# Patient Record
Sex: MALE | Race: WHITE | Employment: OTHER | URBAN - NONMETROPOLITAN AREA
[De-identification: names, ages, dates, MRNs, and addresses within clinical notes are randomized per-mention and may not be internally consistent; named-entity substitution may affect disease eponyms.]

---

## 2020-09-01 ENCOUNTER — TELEPHONE (OUTPATIENT)
Dept: FAMILY MEDICINE CLINIC | Age: 45
End: 2020-09-01

## 2020-09-01 RX ORDER — SCOLOPAMINE TRANSDERMAL SYSTEM 1 MG/1
1 PATCH, EXTENDED RELEASE TRANSDERMAL
Qty: 1 PATCH | Refills: 0 | Status: SHIPPED | OUTPATIENT
Start: 2020-09-01 | End: 2021-06-11 | Stop reason: SDUPTHER

## 2020-09-01 NOTE — TELEPHONE ENCOUNTER
Patient called and is going on a charter this weekend deep sea fishing. He is asking if you can call in motion sickness patch for him? He last seen you 01/13/20.

## 2021-01-15 ENCOUNTER — OFFICE VISIT (OUTPATIENT)
Dept: FAMILY MEDICINE CLINIC | Age: 46
End: 2021-01-15

## 2021-01-15 VITALS
DIASTOLIC BLOOD PRESSURE: 86 MMHG | RESPIRATION RATE: 18 BRPM | TEMPERATURE: 97.9 F | WEIGHT: 240 LBS | HEART RATE: 86 BPM | SYSTOLIC BLOOD PRESSURE: 151 MMHG

## 2021-01-15 DIAGNOSIS — D12.3 ADENOMATOUS POLYP OF TRANSVERSE COLON: ICD-10-CM

## 2021-01-15 DIAGNOSIS — M79.2 PERIPHERAL NEURALGIA: ICD-10-CM

## 2021-01-15 DIAGNOSIS — Z00.00 PREVENTATIVE HEALTH CARE: Primary | ICD-10-CM

## 2021-01-15 PROCEDURE — 99396 PREV VISIT EST AGE 40-64: CPT | Performed by: INTERNAL MEDICINE

## 2021-01-15 NOTE — PROGRESS NOTES
Rubia Heart presents today for   Chief Complaint   Patient presents with   [address]       Is someone accompanying this pt? Patient is alone for appt     Is the patient using any DME equipment during OV? no    Depression Screening:  3 most recent PHQ Screens 1/15/2021   Little interest or pleasure in doing things Not at all   Feeling down, depressed, irritable, or hopeless Not at all   Total Score PHQ 2 0       Learning Assessment:  No flowsheet data found. Health Maintenance reviewed and discussed and ordered per Provider. Health Maintenance Due   Topic Date Due    DTaP/Tdap/Td series (1 - Tdap) 11/20/1997    Lipid Screen  11/20/2016    Flu Vaccine (1) 09/01/2020   . Coordination of Care:  1. Have you been to the ER, urgent care clinic since your last visit? Hospitalized since your last visit? no    2. Have you seen or consulted any other health care providers outside of the [address] [place] [address] since your last visit? Include any pap smears or colon screening.  No

## 2021-01-15 NOTE — PROGRESS NOTES
HPI    Chris Pelletier is a 41 y.o. male and presents today for Establish Care  . 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 More out of shape lately. In the past the patient has had good physical activity but over the last 2 to 3 years has had seen a decrease in his activity. Allergies    No Known Allergies     Medications    Current Outpatient Medications   Medication Sig Dispense    scopolamine (TRANSDERM-SCOP) 1 mg over 3 days pt3d 1 Patch by TransDERmal route every seventy-two (72) hours. 1 Patch     No current facility-administered medications for this visit. Health Maintenance    Health Maintenance Due   Topic Date Due    DTaP/Tdap/Td series (1 - Tdap) 11/20/1997    Lipid Screen  11/20/2016    Flu Vaccine (1) 09/01/2020        Problem List    There are no active problems to display for this patient. Family Hx    Family History   Problem Relation Age of Onset    Cancer Mother         colon cancer     Cancer Maternal Uncle         colon cancer         Social Hx    Social History     Socioeconomic History    Marital status:      Spouse name: Not on file    Number of children: Not on file    Years of education: Not on file    Highest education level: Not on file        Surgical Hx    Past Surgical History:   Procedure Laterality Date    HX COLONOSCOPY      April 2019- needs repeat due to finding two masses           Vitals    Visit Vitals  BP (!) 151/86 (BP 1 Location: Left arm, BP Patient Position: Sitting)   Pulse 86   Temp 97.9 °F (36.6 °C) (Temporal)   Resp 18   Wt 240 lb (108.9 kg)        ROS    Review of Systems   Constitutional: Negative. HENT: Negative. Eyes: Negative. Respiratory: Positive for shortness of breath. Cardiovascular: Positive for palpitations and orthopnea. Gastrointestinal: Negative. Genitourinary: Positive for frequency. Musculoskeletal: Negative. Skin: Negative. Neurological: Negative. Endo/Heme/Allergies: Negative. Psychiatric/Behavioral: Negative. Physical Exam    Physical Exam  Constitutional:       Appearance: Normal appearance. HENT:      Head: Normocephalic. Nose: Nose normal.      Mouth/Throat:      Mouth: Mucous membranes are moist.   Cardiovascular:      Rate and Rhythm: Normal rate. Pulses: Normal pulses. Pulmonary:      Effort: Pulmonary effort is normal.   Abdominal:      General: Abdomen is flat. Musculoskeletal: Normal range of motion. Skin:     General: Skin is warm. Neurological:      General: No focal deficit present. Mental Status: He is alert. Assessment/Plan    1. Preventative health care  Get his PSA follow-up colonoscopy as well as other labs. We will observe better physical and nutritional help. History of exercise regimen with him. 2. 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 We will try better exercise as well as use of his arms and other extremities. But in compression through the way he sleeps or uses extremities. - COLONOSCOPY,DIAGNOSTIC  - CBC WITH AUTOMATED DIFF; Future  - HEMOGLOBIN A1C WITH EAG; Future  - LIPID PANEL; Future  - METABOLIC PANEL, COMPREHENSIVE; Future  - PSA SCREENING (SCREENING); Future  - URINALYSIS W/ RFLX MICROSCOPIC; Future    3. Adenomatous polyp of transverse colon  We will arrange for colonoscopy as noted. Currently he is stable without any blood or other reasons for problems with his colon. - [address] Maintenance Items reviewed with patient as noted.

## 2021-01-15 NOTE — PATIENT INSTRUCTIONS
Learning About Physical Activity What is physical activity? Physical activity is any kind of activity that gets your body moving. The types of physical activity that can help you get fit and stay healthy include: · Aerobic or \"cardio\" activities that make your heart beat faster and make you breathe harder, such as brisk walking, riding a bike, or running. Aerobic activities strengthen your heart and lungs and build up your endurance. · Strength training activities that make your muscles work against, or \"resist,\" something, such as lifting weights or doing push-ups. These activities help tone and strengthen your muscles. · Stretches that allow you to move your joints and muscles through their full range of motion. Stretching helps you be more flexible and avoid injury. What are the benefits of physical activity? Being active is one of the best things you can do for your health. It helps you to: · Feel stronger and have more energy to do all the things you like to do. · Focus better at school or work. · Feel, think, and sleep better. · Reach and stay at a healthy weight. · Lose fat and build lean muscle. · Lower your risk for serious health problems. · Keep your bones, muscles, and joints strong. How can you make physical activity part of your life? Get at least 30 minutes of exercise on most days of the week. Walking is a good choice. You also may want to do other activities, such as running, swimming, cycling, or playing tennis or team sports. Pick activities that you likeones that make your heart beat faster, your muscles stronger, and your muscles and joints more flexible. If you find more than one thing you like doing, do them all. You don't have to do the same thing every day. Get your heart pumping every day. Any activity that makes your heart beat faster and keeps it at that rate for a while counts. Here are some great ways to get your heart beating faster: · Go for a brisk walk, run, or bike ride. · Go for a hike or swim. · Go in-line skating. · Play a game of touch football, basketball, or soccer. · Ride a bike. · Play tennis or racquetball. · Climb stairs. Even some household chores can be aerobicjust do them at a faster pace. Vacuuming, raking or mowing the lawn, sweeping the garage, and washing and waxing the car all can help get your heart rate up. Strengthen your muscles during the week. You don't have to lift heavy weights or grow big, bulky muscles to get stronger. Doing a few simple activities that make your muscles work against, or \"resist,\" something can help you get stronger. For example, you can: · Do push-ups or sit-ups, which use your own body weight as resistance. · Lift weights or dumbbells or use stretch bands at home or in a gym or community center. Stretch your muscles often. Stretching will help you as you become more active. It can help you stay flexible, loosen tight muscles, and avoid injury. It can also help improve your balance and posture and can be a great way to relax. Be sure to stretch the muscles you'll be using when you work out. It's best to warm your muscles slightly before you stretch them. Walk or do some other light aerobic activity for a few minutes, and then start stretching. When you stretch your muscles: · Do it slowly. Stretching is not about going fast or making sudden movements. · Don't push or bounce during a stretch. · Hold each stretch for at least 15 to 30 seconds, if you can. You should feel a stretch in the muscle, but not pain. · Breathe out as you do the stretch. Then breathe in as you hold the stretch. Don't hold your breath. If you're worried about how more activity might affect your health, have a checkup before you start. Follow any special advice your doctor gives you for getting a smart start. Where can you learn more? Go to http://www.[employer]."[employer]"/ Enter N320 in the search box to learn more about \"Learning About Physical Activity. \" Current as of: January 16, 2020               Content Version: 12.6 © 4133-1699 [employer]. Care instructions adapted under license by [employer] (which disclaims liability or warranty for this information). If you have questions about a medical condition or this instruction, always ask your healthcare professional. [place] [address] any warranty or liability for your use of this information.

## 2021-03-04 ENCOUNTER — OFFICE VISIT (OUTPATIENT)
Dept: FAMILY MEDICINE CLINIC | Age: 46
End: 2021-03-04
Payer: COMMERCIAL

## 2021-03-04 VITALS
DIASTOLIC BLOOD PRESSURE: 80 MMHG | OXYGEN SATURATION: 96 % | TEMPERATURE: 98.4 F | HEART RATE: 103 BPM | SYSTOLIC BLOOD PRESSURE: 128 MMHG

## 2021-03-04 DIAGNOSIS — J02.9 SORE THROAT: ICD-10-CM

## 2021-03-04 DIAGNOSIS — R68.89 INFLUENZA-LIKE SYMPTOMS: Primary | ICD-10-CM

## 2021-03-04 DIAGNOSIS — Z20.822 ENCOUNTER FOR LABORATORY TESTING FOR COVID-19 VIRUS: ICD-10-CM

## 2021-03-04 DIAGNOSIS — R68.89 INFLUENZA-LIKE SYMPTOMS: ICD-10-CM

## 2021-03-04 DIAGNOSIS — J02.0 STREP PHARYNGITIS: ICD-10-CM

## 2021-03-04 LAB
S PYO AG THROAT QL: POSITIVE
VALID INTERNAL CONTROL?: YES

## 2021-03-04 PROCEDURE — 99213 OFFICE O/P EST LOW 20 MIN: CPT | Performed by: NURSE PRACTITIONER

## 2021-03-04 PROCEDURE — 87880 STREP A ASSAY W/OPTIC: CPT | Performed by: NURSE PRACTITIONER

## 2021-03-04 RX ORDER — AMOXICILLIN 500 MG/1
500 CAPSULE ORAL 2 TIMES DAILY
Qty: 20 CAP | Refills: 0 | Status: SHIPPED | OUTPATIENT
Start: 2021-03-04 | End: 2021-03-14

## 2021-03-04 NOTE — PROGRESS NOTES
HPI  Darshan Bear is a 45 y.o. male and presents today for Sore Throat (4 days)  He reports 4 days of sore throat, fatigue, cough and headache.  Says he is also noticed that his breath is been smelling terrible despite good oral hygiene practices.  He has no other complaints.  He is self-employed.    Recent Travel Screening and Travel History documentation   Travel Screening     Question   Response    In the last month, have you been in contact with someone who was confirmed or suspected to have Coronavirus / COVID-19?  No / Unsure    Have you had a COVID-19 viral test in the last 14 days?  No    Do you have any of the following new or worsening symptoms?  Sore throat;Fatigue;Cough;Severe headache    Have you traveled internationally in the last month?  No      Travel History   Travel since 02/04/21     No documented travel since 02/04/21          Screening  On the basis of positive PHQ-9 screening ( ), C-SSRS completed.  Patient will follow-up as needed/as directed with PCP.    Allergies  No Known Allergies     Medications  Current Outpatient Medications   Medication Sig Dispense   • amoxicillin (AMOXIL) 500 mg capsule Take 1 Cap by mouth two (2) times a day for 10 days. 20 Cap   • scopolamine (TRANSDERM-SCOP) 1 mg over 3 days pt3d 1 Patch by TransDERmal route every seventy-two (72) hours. 1 Patch     No current facility-administered medications for this visit.         Problem List  There are no active problems to display for this patient.       Vitals  Visit Vitals  /80   Pulse (!) 103   Temp 98.4 °F (36.9 °C)   SpO2 96%        ROS  Review of Systems   Constitutional: Positive for malaise/fatigue. Negative for chills and fever.   HENT: Positive for sore throat. Negative for congestion, ear pain and sinus pain.    Eyes: Negative for blurred vision and redness.   Respiratory: Positive for cough. Negative for sputum production, shortness of breath and wheezing.    Cardiovascular: Negative for chest pain,  palpitations and leg swelling. Gastrointestinal: Negative for abdominal pain, constipation, diarrhea, heartburn, nausea and vomiting. Genitourinary: Negative for dysuria and hematuria. Musculoskeletal: Negative for falls, joint pain and myalgias. Skin: Negative for rash. Neurological: Positive for headaches. Negative for dizziness, seizures and weakness. Endo/Heme/Allergies: Does not bruise/bleed easily. Psychiatric/Behavioral: Negative for depression and suicidal ideas. The patient does not have insomnia. Physical Exam  Physical Exam  Vitals signs and nursing note reviewed. Constitutional:       General: He is awake. He is not in acute distress. Appearance: Normal appearance. He is not ill-appearing or toxic-appearing. HENT:      Head: Normocephalic and atraumatic. Right Ear: Tympanic membrane, ear canal and external ear normal.      Left Ear: Tympanic membrane, ear canal and external ear normal.      Nose: Nose normal. No rhinorrhea. Right Sinus: No maxillary sinus tenderness or frontal sinus tenderness. Left Sinus: No maxillary sinus tenderness or frontal sinus tenderness. Mouth/Throat:      Mouth: Mucous membranes are moist.      Pharynx: Oropharynx is clear. Posterior oropharyngeal erythema present. No oropharyngeal exudate or uvula swelling. Tonsils: No tonsillar exudate or tonsillar abscesses. 1+ on the right. 1+ on the left. Eyes:      Extraocular Movements: Extraocular movements intact. Neck:      Musculoskeletal: Normal range of motion and neck supple. Cardiovascular:      Rate and Rhythm: Normal rate and regular rhythm. Pulmonary:      Effort: No respiratory distress. Breath sounds: Normal breath sounds. No stridor. No wheezing, rhonchi or rales. Musculoskeletal: Normal range of motion. Skin:     Findings: No rash. Neurological:      Mental Status: He is alert and oriented to person, place, and time.    Psychiatric:         Behavior: Behavior normal.          Assessment/Plan  1. Influenza-like symptoms  Outside of treatment window for flu, there is no need to test.  Supportive care discussed. - NOVEL CORONAVIRUS (COVID-19); Future    2. Encounter for laboratory testing for COVID-19 virus  I have low suspicion for Covid but nonetheless we will go ahead and test him to ensure that this is not also causing some of his problems. Instructed patient to self quarantine; answered all questions regarding this/covid. If Covid testing was performed today, Will call with test results as soon as they are available; recommend otc tylenol prn for fever/pain, otc cough syrup is ok to use as directed on packaging unless contraindicated; also recommend vit D/vit C/zinc/b12 daily and melatonin for sleep; discussed deep breathing exercises to be done at home; advised for worsening symptoms, sob, difficulty breathing or any other concerning symptoms, instructed patient to go to nearest ER   - NOVEL CORONAVIRUS (COVID-19); Future    3. Sore throat  RST positive  - AMB POC RAPID STREP A    4. Strep pharyngitis  RST positive; will start antibiotics per Rx; manisha supportive care at home; hand hygiene and oral hygiene discussed with patient  - amoxicillin (AMOXIL) 500 mg capsule; Take 1 Cap by mouth two (2) times a day for 10 days. Dispense: 20 Cap; Refill: 0       Reviewed with him/her that COVID-19 pandemic is an evolving situation with rapidly changing recommendations & guidelines. Medical decisions are made based on the the best information available at the time. Recommended he/she stay tuned for updates published by trusted sources and to advise your PCP of any unexpected changes in clinical condition    Disclaimer:  I have discussed the diagnosis with the patient and the intended plan as seen above. The patient understands our medical plan. The risks, benefits and significant side effects of all medications have been reviewed.  Anticipated time course and progression of condition reviewed. All questions have been addressed. He/She received an after visit summary, with information reviewed, and questions answered. Where appropriate, he/she is instructed to call the clinic if he/she has not been notified either by phone or through [address] with test results. The patient  is to call if his condition worsens or fails to improve or if significant side effects are experienced.        JENNY Mancuso-PRINCE toddg

## 2021-03-04 NOTE — PROGRESS NOTES
[facility] presents today for   Chief Complaint   Patient presents with    Sore Throat     4 days       Is someone accompanying this pt? No         Is the patient using any DME equipment during [address]? no    Depression Screening:  3 most recent PHQ Screens 3/4/2021   Little interest or pleasure in doing things Not at all   Feeling down, depressed, irritable, or hopeless Not at all   Total Score PHQ 2 0       Learning Assessment:  Learning Assessment 1/15/2021   PRIMARY LEARNER Patient   PRIMARY LANGUAGE ENGLISH   LEARNER PREFERENCE PRIMARY READING     LISTENING     DEMONSTRATION     OTHER (COMMENT)   ANSWERED BY patient    RELATIONSHIP SELF         Health Maintenance reviewed and discussed and ordered per Provider. Health Maintenance Due   Topic Date Due    DTaP/Tdap/Td series (1 - Tdap) Never done    Lipid Screen  Never done    Flu Vaccine (1) Never done   . Coordination of Care:  1. Have you been to the ER, urgent care clinic since your last visit? Hospitalized since your last visit? no    2. Have you seen or consulted any other health care providers outside of the [address] [place] [address] since your last visit? Include any pap smears or colon screening.  no

## 2021-03-06 LAB — SARS-COV-2, NAA: NOT DETECTED

## 2021-04-06 ENCOUNTER — OFFICE VISIT (OUTPATIENT)
Dept: FAMILY MEDICINE CLINIC | Age: 46
End: 2021-04-06
Payer: COMMERCIAL

## 2021-04-06 VITALS
BODY MASS INDEX: 35.07 KG/M2 | OXYGEN SATURATION: 100 % | HEIGHT: 70 IN | WEIGHT: 245 LBS | RESPIRATION RATE: 18 BRPM | TEMPERATURE: 97.1 F | DIASTOLIC BLOOD PRESSURE: 93 MMHG | SYSTOLIC BLOOD PRESSURE: 153 MMHG | HEART RATE: 82 BPM

## 2021-04-06 DIAGNOSIS — H69.82 DYSFUNCTION OF LEFT EUSTACHIAN TUBE: Primary | ICD-10-CM

## 2021-04-06 DIAGNOSIS — I10 ESSENTIAL HYPERTENSION: ICD-10-CM

## 2021-04-06 PROCEDURE — 99213 OFFICE O/P EST LOW 20 MIN: CPT | Performed by: INTERNAL MEDICINE

## 2021-04-06 RX ORDER — FLUTICASONE PROPIONATE 50 MCG
SPRAY, SUSPENSION (ML) NASAL
Qty: 1 BOTTLE | Refills: 2 | Status: SHIPPED | OUTPATIENT
Start: 2021-04-06

## 2021-04-06 RX ORDER — LISINOPRIL AND HYDROCHLOROTHIAZIDE 12.5; 2 MG/1; MG/1
1 TABLET ORAL DAILY
Qty: 30 TAB | Refills: 1 | Status: SHIPPED | OUTPATIENT
Start: 2021-04-06

## 2021-04-06 RX ORDER — PREDNISONE 10 MG/1
TABLET ORAL
Qty: 10 TAB | Refills: 0 | Status: SHIPPED | OUTPATIENT
Start: 2021-04-06

## 2021-04-06 RX ORDER — LEVOCETIRIZINE DIHYDROCHLORIDE 5 MG/1
5 TABLET, FILM COATED ORAL DAILY
Qty: 30 TAB | Refills: 1 | Status: SHIPPED | OUTPATIENT
Start: 2021-04-06

## 2021-04-06 NOTE — PROGRESS NOTES
Subjective:   Shannan Martinez is a 41 y.o. male who was seen for Ear Fullness (hearing loss in ear, water in ear from scuba trip)    HPI   Noted fullness in left ear. After scuba diving. Cant open left side with pressure changes. No fever or other illness. Noted allergies lately. Has had a similar problem with his ear in the past.  No actual hearing deficits or pain. Continues to be concerned about his blood pressure. He continues to have exertional shortness of breath and discomfort. He otherwise is noted that his blood pressure even at home has been elevated. Home Medications    Medication Sig Start Date End Date Taking? Authorizing Provider   scopolamine (TRANSDERM-SCOP) 1 mg over 3 days pt3d 1 Patch by TransDERmal route every seventy-two (72) hours. 9/1/20   Maryjo Sheehan NP      No Known Allergies  Social History     Tobacco Use    Smoking status: Never Smoker    Smokeless tobacco: Never Used   Substance Use Topics    Alcohol use: Yes     Alcohol/week: 3.0 standard drinks     Types: 3 Cans of beer per week     Frequency: 2-4 times a month     Drinks per session: 3 or 4    Drug use: Yes     Types: Marijuana            Review of Systems   Headache chest pain no angina at rest.  Physical Exam reveals fullness of both TMs. He has normal canal architecture with no wax. Objective:     Visit Vitals  BP (!) 153/93 (BP 1 Location: Right upper arm, BP Patient Position: Sitting)   Pulse 82   Temp 97.1 °F (36.2 °C)   Resp 18   Ht 5' 10\" (1.778 m)   Wt 245 lb (111.1 kg)   SpO2 100%   BMI 35.15 kg/m²      alert, cooperative, no distress   normal mood, behavior, speech, dress, motor activity, and thought processes, able to follow commands          Assessment & Plan:     1. Dysfunction of left eustachian tube  Patient is started on nasal spray as well as treating his allergies.   Improving after about 4 days he will stay with a prednisone burst.  Watch carefully to see that the eustachian tube Verbal instructions given to pt for Egd on 12/14/17.   reequilibrated. 2. Essential hypertension  Due to the patient's high blood pressure we will start him on lisinopril hydrochlorothiazide addition due to his exertional possible angina we will have him do a stress echo. - ECHO STRESS; Future        I spent at least 23 minutes on this visit with this established patient. [id number]    Additional exam findings: We discussed the expected course, resolution and complications of the diagnosis(es) in detail. Medication risks, benefits, costs, interactions, and alternatives were discussed as indicated. I advised him to contact the office if his condition worsens, changes or fails to improve as anticipated. He expressed understanding with the diagnosis(es) and plan.

## 2021-04-06 NOTE — PATIENT INSTRUCTIONS
Eustachian Tube Problems: Care Instructions Your Care Instructions The eustachian (say \"you-STAY-shee-un\") tubes run between the inside of the ears and the throat. They keep air pressure stable in the ears. If your eustachian tubes become blocked, the air pressure in your ears changes. The fluids from a cold can clog eustachian tubes, causing pain in the ears. A quick change in air pressure can cause eustachian tubes to close up. This might happen when an airplane changes altitude or when a  goes up or down underwater. Eustachian tube problems often clear up on their own or after antibiotic treatment. If your tubes continue to be blocked, you may need surgery. Follow-up care is a key part of your treatment and safety. Be sure to make and go to all appointments, and call your doctor if you are having problems. It's also a good idea to know your test results and keep a list of the medicines you take. How can you care for yourself at home? · To ease ear pain, apply a warm washcloth or a heating pad set on low. There may be some drainage from the ear when the heat melts earwax. Put a cloth between the heat source and your skin. Do not use a heating pad with children. · If your doctor prescribed antibiotics, take them as directed. Do not stop taking them just because you feel better. You need to take the full course of antibiotics. · Your doctor may recommend over-the-counter medicine. Be safe with medicines. Oral or nasal decongestants may relieve ear pain. Avoid decongestants that are combined with antihistamines, which tend to cause more blockage. But if allergies seem to be the problem, your doctor may recommend a combination. Be careful with cough and cold medicines. Don't give them to children younger than 6, because they don't work for children that age and can even be harmful. For children 6 and older, always follow all the instructions carefully.  Make sure you know how much medicine to give and how long to use it. And use the dosing device if one is included. When should you call for help? Call your doctor now or seek immediate medical care if: 
  · You develop sudden, complete hearing loss.  
  · You have severe pain or feel dizzy.  
  · You have new or increasing pus or blood draining from your ear.  
  · You have redness, swelling, or pain around or behind the ear. Watch closely for changes in your health, and be sure to contact your doctor if: 
  · You do not get better after 2 weeks.  
  · You have any new symptoms, such as itching or a feeling of fullness in the ear. Where can you learn more? Go to http://www.gray.com/ Enter Y822 in the search box to learn more about \"Eustachian Tube Problems: Care Instructions. \" Current as of: December 2, 2020               Content Version: 12.8 © 0715-5171 [employer]. Care instructions adapted under license by [employer] (which disclaims liability or warranty for this information). If you have questions about a medical condition or this instruction, always ask your healthcare professional. [address] any warranty or liability for your use of this information.

## 2021-04-06 NOTE — PROGRESS NOTES
Yancy Wilson presents today for   Chief Complaint   Patient presents with    Ear Fullness     hearing loss in ear, water in ear from [place] trip       Is someone accompanying this pt? No    Is the patient using any DME equipment during OV? No    Depression Screening:  3 most recent PHQ Screens 4/6/2021   Little interest or pleasure in doing things Not at all   Feeling down, depressed, irritable, or hopeless Not at all   Total Score PHQ 2 0       Learning Assessment:  Learning Assessment 1/15/2021   PRIMARY LEARNER Patient   PRIMARY LANGUAGE ENGLISH   LEARNER PREFERENCE PRIMARY READING     LISTENING     DEMONSTRATION     OTHER (COMMENT)   ANSWERED BY patient    RELATIONSHIP SELF       Fall Risk  No flowsheet data found. ADL  ADL Assessment 1/15/2021   Feeding yourself No Help Needed   Getting from bed to chair No Help Needed   Getting dressed No Help Needed   Bathing or showering No Help Needed   Walk across the room (includes cane/walker) No Help Needed   Using the telphone No Help Needed   Taking your medications No Help Needed   Preparing meals No Help Needed   Managing money (expenses/bills) No Help Needed   Moderately strenuous housework (laundry) No Help Needed   Shopping for personal items (toiletries/medicines) No Help Needed   Shopping for groceries No Help Needed   Driving No Help Needed   Climbing a flight of stairs No Help Needed   Getting to places beyond walking distances No Help Needed       Health Maintenance reviewed and discussed and ordered per Provider. Health Maintenance Due   Topic Date Due    Hepatitis C Screening  Never done    DTaP/Tdap/Td series (1 - Tdap) Never done    Lipid Screen  Never done   . Coordination of Care:  1. Have you been to the ER, urgent care clinic since your last visit? Hospitalized since your last visit? No    2. Have you seen or consulted any other health care providers outside of the [address] [place] [address] [facility] since your last visit?  Include any pap smears or colon screening.    No

## 2021-04-15 ENCOUNTER — HOSPITAL ENCOUNTER (OUTPATIENT)
Dept: NON INVASIVE DIAGNOSTICS | Age: 46
Discharge: HOME | End: 2021-04-15
Attending: INTERNAL MEDICINE
Payer: COMMERCIAL

## 2021-04-15 DIAGNOSIS — I10 ESSENTIAL HYPERTENSION: ICD-10-CM

## 2021-04-15 PROCEDURE — 93351 STRESS TTE COMPLETE: CPT

## 2021-04-15 PROCEDURE — 74011250636 HC RX REV CODE- 250/636: Performed by: INTERNAL MEDICINE

## 2021-04-15 RX ADMIN — PERFLUTREN 2 ML: 6.52 INJECTION, SUSPENSION INTRAVENOUS at 14:33

## 2021-04-16 LAB
STRESS ANGINA INDEX: 0
STRESS BASELINE DIAS BP: 92 MMHG
STRESS BASELINE HR: 89 BPM
STRESS BASELINE SYS BP: 140 MMHG
STRESS ESTIMATED WORKLOAD: 11.9 METS
STRESS EXERCISE DUR MIN: NORMAL
STRESS PEAK DIAS BP: 98 MMHG
STRESS PEAK SYS BP: 178 MMHG
STRESS PERCENT HR ACHIEVED: 97 %
STRESS POST PEAK HR: 171 BPM
STRESS RATE PRESSURE PRODUCT: NORMAL BPM*MMHG
STRESS SR DUKE TREADMILL SCORE: 0
STRESS ST DEPRESSION: 0 MM
STRESS ST ELEVATION: 0 MM
STRESS TARGET HR: 176 BPM

## 2021-04-19 NOTE — PROGRESS NOTES
Called patient and let him know his stress test results per Dr. Fabiola Severin. Patient verbalized understanding.

## 2021-06-11 ENCOUNTER — OFFICE VISIT (OUTPATIENT)
Dept: FAMILY MEDICINE CLINIC | Age: 46
End: 2021-06-11
Payer: COMMERCIAL

## 2021-06-11 VITALS
DIASTOLIC BLOOD PRESSURE: 76 MMHG | BODY MASS INDEX: 34.32 KG/M2 | HEIGHT: 70 IN | OXYGEN SATURATION: 96 % | WEIGHT: 239.7 LBS | HEART RATE: 83 BPM | SYSTOLIC BLOOD PRESSURE: 123 MMHG | TEMPERATURE: 98.8 F

## 2021-06-11 DIAGNOSIS — H69.82 DYSFUNCTION OF LEFT EUSTACHIAN TUBE: ICD-10-CM

## 2021-06-11 DIAGNOSIS — A09 INFECTIOUS DIARRHEA: Primary | ICD-10-CM

## 2021-06-11 PROCEDURE — 99213 OFFICE O/P EST LOW 20 MIN: CPT | Performed by: INTERNAL MEDICINE

## 2021-06-11 RX ORDER — SCOLOPAMINE TRANSDERMAL SYSTEM 1 MG/1
1 PATCH, EXTENDED RELEASE TRANSDERMAL
Qty: 2 PATCH | Refills: 1 | Status: SHIPPED | OUTPATIENT
Start: 2021-06-11

## 2021-06-11 NOTE — PROGRESS NOTES
Kita Santana presents today for   Chief Complaint   Patient presents with    Follow-up     since monday he has had diarrea         Is someone accompanying this pt? no    Is the patient using any DME equipment during [address]? no    Depression Screening:  3 most recent PHQ Screens 6/11/2021   Little interest or pleasure in doing things Not at all   Feeling down, depressed, irritable, or hopeless Not at all   Total Score PHQ 2 0       Learning Assessment:  Learning Assessment 1/15/2021   PRIMARY LEARNER Patient   PRIMARY LANGUAGE ENGLISH   LEARNER PREFERENCE PRIMARY READING     LISTENING     DEMONSTRATION     OTHER (COMMENT)   ANSWERED BY patient    RELATIONSHIP SELF       Fall Risk  No flowsheet data found. ADL  ADL Assessment 6/11/2021   Feeding yourself No Help Needed   Getting from bed to chair No Help Needed   Getting dressed No Help Needed   Bathing or showering No Help Needed   Walk across the room (includes cane/walker) No Help Needed   Using the telphone No Help Needed   Taking your medications No Help Needed   Preparing meals No Help Needed   Managing money (expenses/bills) No Help Needed   Moderately strenuous housework (laundry) No Help Needed   Shopping for personal items (toiletries/medicines) No Help Needed   Shopping for groceries No Help Needed   Driving No Help Needed   Climbing a flight of stairs No Help Needed   Getting to places beyond walking distances No Help Needed       Travel Screening:    Travel Screening      No screening recorded since 06/10/21 0000      Travel History   Travel since 05/11/21     No documented travel since 05/11/21          Health Maintenance reviewed and discussed and ordered per Provider. Health Maintenance Due   Topic Date Due    Hepatitis C Screening  Never done    COVID-19 Vaccine (1) Never done    DTaP/Tdap/Td series (1 - Tdap) Never done    Lipid Screen  Never done   . Coordination of Care:  1.  Have you been to the ER, urgent care clinic since your last visit? Hospitalized since your last visit? no    2. Have you seen or consulted any other health care providers outside of the [address] [place] [address] since your last visit? Include any pap smears or colon screening.  no

## 2021-06-11 NOTE — PROGRESS NOTES
Subjective:   Chriss Piper is a 41 y.o. male who was seen for Follow-up (since monday he has had diarrea  )    HPI   He is noted to stop urea to start on Monday he did have a big dinner out eating amongst friends on Sunday. Is any fever no blood no change in stools. It is helped significantly to take some Imodium. Has no other ill contacts that he knows of. No abdominal pain no nausea. Home Medications    Medication Sig Start Date End Date Taking? Authorizing Provider   fluticasone propionate (FLONASE) 50 mcg/actuation nasal spray 1 spray each nostril 2 xx day 4/6/21  Yes Sravani Ashby MD   lisinopril-hydroCHLOROthiazide (PRINZIDE, ZESTORETIC) 20-12.5 mg per tablet Take 1 Tab by mouth daily. 4/6/21  Yes Sravani Ashby MD   levocetirizine (XYZAL) 5 mg tablet Take 1 Tab by mouth daily. Patient not taking: Reported on 6/11/2021 4/6/21   Sravani Ashby MD   predniSONE (DELTASONE) 10 mg tablet Take 3 tabs for 3 days  Patient not taking: Reported on 6/11/2021 4/6/21   Sravani Ashby MD   scopolamine (TRANSDERM-SCOP) 1 mg over 3 days pt3d 1 Patch by TransDERmal route every seventy-two (72) hours. Patient not taking: Reported on 6/11/2021 9/1/20   Lauren Malik NP      No Known Allergies  Social History     Tobacco Use    Smoking status: Never Smoker    Smokeless tobacco: Never Used   Vaping Use    Vaping Use: Never used   Substance Use Topics    Alcohol use: Yes     Alcohol/week: 3.0 standard drinks     Types: 3 Cans of beer per week    Drug use: Yes     Types: Marijuana            Review of Systems     Physical Exam   Objective:     Visit Vitals  /76   Pulse 83   Temp 98.8 °F (37.1 °C)   Ht 5' 10\" (1.778 m)   Wt 239 lb 11.2 oz (108.7 kg)   SpO2 96%   BMI 34.39 kg/m²      alert, cooperative, no distress   normal mood, behavior, speech, dress, motor activity, and thought processes, able to follow commands          Assessment & Plan:     1.  Infectious diarrhea  We talked about different etiologies as well as how to treat this he will continue on his Imodium. His diet limit any dairy or sugary drinks. I would assume he will be better in the next 2 to 3 days if he still having diarrhea next week we might have to do a stool culture. 2. Dysfunction of left eustachian tube  We will send in some scopolamine for his trip as well as for any further eustachian tube dysfunction. 712    Additional exam findings: We discussed the expected course, resolution and complications of the diagnosis(es) in detail. Medication risks, benefits, costs, interactions, and alternatives were discussed as indicated. I advised him to contact the office if his condition worsens, changes or fails to improve as anticipated. He expressed understanding with the diagnosis(es) and plan.

## 2023-05-25 ENCOUNTER — HOSPITAL ENCOUNTER (OUTPATIENT)
Age: 48
Setting detail: RECURRING SERIES
Discharge: HOME | End: 2023-05-28
Payer: COMMERCIAL

## 2023-05-25 PROCEDURE — 97161 PT EVAL LOW COMPLEX 20 MIN: CPT

## 2023-07-26 ENCOUNTER — OFFICE VISIT (OUTPATIENT)
Age: 48
End: 2023-07-26

## 2023-07-26 VITALS — BODY MASS INDEX: 34.39 KG/M2 | RESPIRATION RATE: 18 BRPM | HEIGHT: 70 IN

## 2023-07-26 DIAGNOSIS — M25.511 CHRONIC RIGHT SHOULDER PAIN: ICD-10-CM

## 2023-07-26 DIAGNOSIS — G89.29 CHRONIC RIGHT SHOULDER PAIN: ICD-10-CM

## 2023-07-26 DIAGNOSIS — S46.011A TRAUMATIC TEAR OF RIGHT ROTATOR CUFF, UNSPECIFIED TEAR EXTENT, INITIAL ENCOUNTER: Primary | ICD-10-CM

## 2023-07-26 RX ORDER — TRIAMCINOLONE ACETONIDE 40 MG/ML
40 INJECTION, SUSPENSION INTRA-ARTICULAR; INTRAMUSCULAR ONCE
Status: COMPLETED | OUTPATIENT
Start: 2023-07-26 | End: 2023-07-26

## 2023-07-26 RX ADMIN — TRIAMCINOLONE ACETONIDE 40 MG: 40 INJECTION, SUSPENSION INTRA-ARTICULAR; INTRAMUSCULAR at 13:46

## 2023-07-26 NOTE — PROGRESS NOTES
dislocations. ASSESSMENT & PLAN  Diagnosis: Right shoulder rotator cuff and biceps pain    Juanpablo Springer has a traumatic right shoulder injury with subsequent rotator cuff and biceps pain. We discussed the treatment options for this today at length. I recommended he consider an MRI. At this point he would like to try to treat this conservatively and he opted for corticosteroid injection. Follow-up in 6 weeks. If symptoms persist neck step would be an MRI. Prescription medication management discussed. Plan was discussed in detail with patient, all questions were answered, and patient voiced understanding of plan. [facility]  OFFICE PROCEDURE PROGRESS NOTE        Chart reviewed for the following:   Zainba Gomez MD, have reviewed the History, Physical and updated the Allergic reactions for [address] performed immediately prior to start of procedure:   Zainab Gomez MD, have performed the following reviews on [place] prior to the start of the procedure:            * Patient was identified by name and date of birth   * Agreement on procedure being performed was verified  * Risks and Benefits explained to the patient  * Procedure site verified and marked as necessary  * Patient was positioned for comfort  * Consent was signed and verified    Time: 1:45 PM    Location: Right shoulder subacromial space injection    Kenalog 40mg & 3cc Lidocaine    Date of procedure: 7/26/2023    Procedure performed by:  Charli Randhawa MD    Provider assisted by: Office     Patient assisted by: self    How tolerated by patient: tolerated the procedure well with no complications    Post Procedural Pain Scale: 0 - No Hurt    Comments: none      Electronically signed by: Charli Randhawa MD    Note: This note was completed using voice recognition software.   Any typographical/name errors or mistakes are unintentional.

## 2023-09-13 ENCOUNTER — OFFICE VISIT (OUTPATIENT)
Age: 48
End: 2023-09-13
Payer: COMMERCIAL

## 2023-09-13 VITALS — RESPIRATION RATE: 18 BRPM | BODY MASS INDEX: 34.39 KG/M2 | HEIGHT: 70 IN

## 2023-09-13 DIAGNOSIS — S46.011A TRAUMATIC TEAR OF RIGHT ROTATOR CUFF, UNSPECIFIED TEAR EXTENT, INITIAL ENCOUNTER: Primary | ICD-10-CM

## 2023-09-13 PROCEDURE — 99213 OFFICE O/P EST LOW 20 MIN: CPT | Performed by: ORTHOPAEDIC SURGERY

## 2023-09-13 NOTE — PATIENT INSTRUCTIONS
If we order a Diagnostic test (such as MRI or CT) during your office visit please see below:     Coordination of Care will be calling you to schedule your diagnostic test. If you have not heard from Coordination of Care within 3 business days, please call [phone]. Once you have a date scheduled for your diagnostic test, you will need to contact our office to schedule a follow up appointment, as this is when the physician will review your diagnostic test results with you. You can contact our office to schedule appointment by phone at [phone], or you can send a message via [employer] to request an appointment.     Right shoulder MRI ordered today, 9/13/2023

## 2024-07-10 ENCOUNTER — OFFICE VISIT (OUTPATIENT)
Age: 49
End: 2024-07-10
Payer: COMMERCIAL

## 2024-07-10 VITALS — RESPIRATION RATE: 16 BRPM | HEIGHT: 70 IN | BODY MASS INDEX: 34.39 KG/M2

## 2024-07-10 DIAGNOSIS — M77.12 LATERAL EPICONDYLITIS OF LEFT ELBOW: Primary | ICD-10-CM

## 2024-07-10 PROCEDURE — 99213 OFFICE O/P EST LOW 20 MIN: CPT | Performed by: ORTHOPAEDIC SURGERY

## 2024-07-10 PROCEDURE — 20551 NJX 1 TENDON ORIGIN/INSJ: CPT | Performed by: ORTHOPAEDIC SURGERY

## 2024-07-10 RX ORDER — TRIAMCINOLONE ACETONIDE 40 MG/ML
40 INJECTION, SUSPENSION INTRA-ARTICULAR; INTRAMUSCULAR ONCE
Status: COMPLETED | OUTPATIENT
Start: 2024-07-10 | End: 2024-07-10

## 2024-07-10 RX ORDER — LIDOCAINE HYDROCHLORIDE 10 MG/ML
1 INJECTION, SOLUTION INFILTRATION; PERINEURAL ONCE
Status: COMPLETED | OUTPATIENT
Start: 2024-07-10 | End: 2024-07-10

## 2024-07-10 RX ADMIN — TRIAMCINOLONE ACETONIDE 40 MG: 40 INJECTION, SUSPENSION INTRA-ARTICULAR; INTRAMUSCULAR at 09:23

## 2024-07-10 RX ADMIN — LIDOCAINE HYDROCHLORIDE 1 ML: 10 INJECTION, SOLUTION INFILTRATION; PERINEURAL at 09:23

## 2024-07-10 NOTE — PROGRESS NOTES
[facility] AMBULATORY OFFICE NOTE      Patient: Nikita Ridley                MRN: [id number]       SSN: [id number]  YOB: 1975        AGE: 48 y.o.        SEX: male  Body mass index is 34.39 kg/m².    PCP: Megan Peña, BRONWYN - CNP  07/10/24    CHIEF COMPLAINT: Left elbow pain    HPI: Nikita Ridley is a 48 y.o. male patient who complains of 6 to 8 weeks of left elbow pain.  He noticed it after work 1 day.  He is been having lateral elbow pain along with a burning pain in his proximal forearm since that time.  Pain is worse with lifting.  He has tried over-the-counter medications without resolution of his symptoms.    No past medical history on file.    Family History   Problem Relation Age of Onset    Cancer Maternal Uncle         colon cancer     No Known Problems Father     Cancer Mother         colon cancer        Current Outpatient Medications   Medication Sig Dispense Refill    fluticasone (FLONASE) 50 MCG/ACT nasal spray 1 spray each nostril 2 xx day      levocetirizine (XYZAL) 5 MG tablet Take 5 mg by mouth daily      lisinopril-hydroCHLOROthiazide (PRINZIDE;ZESTORETIC) 20-12.5 MG per tablet Take 1 tablet by mouth daily      predniSONE (DELTASONE) 10 MG tablet Take 3 tabs for 3 days      scopolamine (TRANSDERM-SCOP) transdermal patch Place 1 patch onto the skin every 72 hours       No current facility-administered medications for this visit.       No Known Allergies    Past Surgical History:   Procedure Laterality Date    COLONOSCOPY      April 2019- needs repeat due to finding two masses        Social History     Socioeconomic History    Marital status:      Spouse name: Not on file    Number of children: Not on file    Years of education: Not on file    Highest education level: Not on file   Occupational History    Not on file   Tobacco Use    Smoking status: Never    Smokeless tobacco: Never   Substance and Sexual Activity    Alcohol

## 2024-11-27 ENCOUNTER — OFFICE VISIT (OUTPATIENT)
Age: 49
End: 2024-11-27

## 2024-11-27 VITALS — BODY MASS INDEX: 34.39 KG/M2 | HEIGHT: 70 IN | RESPIRATION RATE: 16 BRPM

## 2024-11-27 DIAGNOSIS — M77.12 LATERAL EPICONDYLITIS OF LEFT ELBOW: Primary | ICD-10-CM

## 2024-11-27 RX ORDER — LIDOCAINE HYDROCHLORIDE 10 MG/ML
1 INJECTION, SOLUTION INFILTRATION; PERINEURAL ONCE
Status: COMPLETED | OUTPATIENT
Start: 2024-11-27 | End: 2024-11-27

## 2024-11-27 RX ADMIN — LIDOCAINE HYDROCHLORIDE 1 ML: 10 INJECTION, SOLUTION INFILTRATION; PERINEURAL at 10:37

## 2024-11-27 NOTE — PROGRESS NOTES
[facility]UL[facility] OFFICE NOTE    Patient: Nikita Ridley                MRN: [id number]       SSN: [id number]  YOB: 1975        AGE: 49 y.o.        SEX: male  Body mass index is 34.39 kg/m².    PCP: Megan Peña, APRN - CNP  11/27/24    Chief Complaint: Left elbow follow-up    HPI: Nikita Ridley is a 49 y.o. male patient who returns today for his left elbow.  He started to have pain over the lateral epicondyle again.    No past medical history on file.    Family History   Problem Relation Age of Onset    Cancer Maternal Uncle         colon cancer     No Known Problems Father     Cancer Mother         colon cancer        Current Outpatient Medications   Medication Sig Dispense Refill    fluticasone (FLONASE) 50 MCG/ACT nasal spray 1 spray each nostril 2 xx day      levocetirizine (XYZAL) 5 MG tablet Take 5 mg by mouth daily      lisinopril-hydroCHLOROthiazide (PRINZIDE;ZESTORETIC) 20-12.5 MG per tablet Take 1 tablet by mouth daily      predniSONE (DELTASONE) 10 MG tablet Take 3 tabs for 3 days      scopolamine (TRANSDERM-SCOP) transdermal patch Place 1 patch onto the skin every 72 hours       No current facility-administered medications for this visit.       No Known Allergies    Past Surgical History:   Procedure Laterality Date    COLONOSCOPY      April 2019- needs repeat due to finding two masses        Social History     Socioeconomic History    Marital status:      Spouse name: Not on file    Number of children: Not on file    Years of education: Not on file    Highest education level: Not on file   Occupational History    Not on file   Tobacco Use    Smoking status: Never    Smokeless tobacco: Never   Substance and Sexual Activity    Alcohol use: Yes     Alcohol/week: 3.0 standard drinks of alcohol    Drug use: Yes     Types: Marijuana (Weed)    Sexual activity: Not on file   Other Topics Concern    Not on file   Social History

## 2025-01-22 ENCOUNTER — OFFICE VISIT (OUTPATIENT)
Age: 50
End: 2025-01-22
Payer: COMMERCIAL

## 2025-01-22 VITALS — HEIGHT: 70 IN | RESPIRATION RATE: 16 BRPM | BODY MASS INDEX: 34.39 KG/M2

## 2025-01-22 DIAGNOSIS — M77.12 LATERAL EPICONDYLITIS OF LEFT ELBOW: Primary | ICD-10-CM

## 2025-01-22 PROCEDURE — 99213 OFFICE O/P EST LOW 20 MIN: CPT | Performed by: ORTHOPAEDIC SURGERY

## 2025-01-22 NOTE — PROGRESS NOTES
Not on file   Social History Narrative    Not on file     Social Determinants of Health     Financial Resource Strain: Not on file   Food Insecurity: Not on file   Transportation Needs: Not on file   Physical Activity: Not on file   Stress: Not on file   Social Connections: Not on file   Intimate Partner Violence: Not on file   Housing Stability: Not on file       REVIEW OF SYSTEMS:      No changes from previous review of systems unless noted.    PHYSICAL EXAMINATION:  Resp 16   Ht 1.778 m (5' 10\")   BMI 34.39 kg/m²   Body mass index is 34.39 kg/m².  GENERAL: Alert and oriented x3, in no acute distress.  HEENT: Normocephalic, atraumatic.    Tender to palpation over the lateral epicondyle pain with resisted wrist extension    IMAGING:  Imaging read by myself and interpreted as follows:      ASSESSMENT & PLAN  Diagnosis: Left elbow lateral epicondylitis    Nikita Ridley continues to have left tennis elbow.  He has had symptoms for about 6 to 7 months.  Not up to a year.  I told him to wait until about 12 months of symptoms as this is likely to resolve.  No more injections at this time.  If he continues to have symptoms I recommended he get an MRI and follow-up with Dr. Acevedo to discuss possible minimally invasive surgical options to treat his tennis elbow.  He will do exercises on his own from the Internet in the meantime.    Prescription medication management discussed. In the interim prior to the next visit should symptoms worsen I recommend Tylenol or OTC NSAIDs to be taken as needed as long as these medications are not medically contraindicated.     Plan was discussed in detail with patient, all questions were answered, and patient voiced understanding of plan.    Electronically signed by: Richie Lee MD     Note: This note was completed using voice recognition software.  Any typographical/name errors or mistakes are unintentional.